# Patient Record
Sex: FEMALE | Race: WHITE
[De-identification: names, ages, dates, MRNs, and addresses within clinical notes are randomized per-mention and may not be internally consistent; named-entity substitution may affect disease eponyms.]

---

## 2017-04-11 ENCOUNTER — HOSPITAL ENCOUNTER (OUTPATIENT)
Dept: HOSPITAL 75 - RAD | Age: 62
End: 2017-04-11
Attending: NURSE PRACTITIONER
Payer: MEDICARE

## 2017-04-11 DIAGNOSIS — Z12.31: Primary | ICD-10-CM

## 2017-04-11 PROCEDURE — 77067 SCR MAMMO BI INCL CAD: CPT

## 2017-04-13 NOTE — DIAGNOSTIC IMAGING REPORT
Bilateral screening mammogram.



The current study was also evaluated with a Computer Aided

Detection (CAD) system.



INDICATION: Screening. No current complaints stated on the

questionnaire.



COMPARISON: Old previous from [place]. This study was performed

based on the patient history, however, the previous films could

not be obtained. This is therefore essentially a baseline study.



FINDINGS:

The breasts are composed of heterogeneously dense parenchyma

which may decrease mammographic sensitivity. The breasts are

less dense in the medial aspect on both sides. There is a

central posterior left breast asymmetry seen with questionable

correlate along the left MLO view central aspect. There are

numerous calcifications in both breasts mostly however on the

left side in the upper-outer quadrant. Many of these

calcifications are coarse, and these are likely benign. The

right breast demonstrates no suspicious mass or cluster of

calcification.



IMPRESSION: Focal asymmetry in the central posterior aspect of

the left breast. Also calcifications in the upper outer quadrant

favored to be benign are seen. Magnification views of the

upper-outer quadrant and focal compression view and ultrasound

evaluation for the central left breast focal asymmetry is

recommended.



ACR BI-RADS Category 0: Incomplete. (Needs additional imaging

evaluation).

Result letter will be mailed to the patient.

Note: At least 10% of breast cancer is not imaged by mammography.



Dictated by:



Dictated on workstation # [id number]

## 2017-05-01 ENCOUNTER — HOSPITAL ENCOUNTER (OUTPATIENT)
Dept: HOSPITAL 75 - RAD | Age: 62
End: 2017-05-01
Attending: NURSE PRACTITIONER
Payer: MEDICARE

## 2017-05-01 DIAGNOSIS — N64.89: Primary | ICD-10-CM

## 2017-05-01 PROCEDURE — 76641 ULTRASOUND BREAST COMPLETE: CPT
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
## 2017-07-13 ENCOUNTER — HOSPITAL ENCOUNTER (OUTPATIENT)
Dept: HOSPITAL 75 - RAD | Age: 62
End: 2017-07-13
Attending: NURSE PRACTITIONER
Payer: MEDICARE

## 2017-07-13 DIAGNOSIS — R79.89: Primary | ICD-10-CM

## 2017-07-13 PROCEDURE — 76770 US EXAM ABDO BACK WALL COMP: CPT

## 2017-07-13 NOTE — DIAGNOSTIC IMAGING REPORT
Bilateral renal ultrasound.



INDICATION: Elevated creatinine.



FINDINGS:

The right kidney is 9.6 cm, and the left kidney is 10.7 cm in

length. There is no hydronephrosis or focal lesion seen.



The urinary bladder appears unremarkable.



IMPRESSION: Unremarkable exam.



Dictated by: 



  Dictated on workstation # [id number]

## 2019-06-25 ENCOUNTER — HOSPITAL ENCOUNTER (OUTPATIENT)
Dept: HOSPITAL 75 - RAD | Age: 64
End: 2019-06-25
Attending: NURSE PRACTITIONER
Payer: MEDICARE

## 2019-06-25 DIAGNOSIS — Z12.31: Primary | ICD-10-CM

## 2019-06-25 DIAGNOSIS — Z78.0: ICD-10-CM

## 2019-06-25 PROCEDURE — 77067 SCR MAMMO BI INCL CAD: CPT

## 2019-06-25 PROCEDURE — 77080 DXA BONE DENSITY AXIAL: CPT
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
## 2019-06-26 NOTE — DIAGNOSTIC IMAGING REPORT
INDICATION: 64-year-old postmenopausal female.



COMPARISON: None.



FINDINGS:



AP Spine L2-L4:  

[BMD (g/cm2): 1.158] [T-Score: -0.3] [Z-Score: 0.0]

[BMD Previous: na] [BMD % Change: na]



LT Hip Neck:       

[BMD (g/cm2): 0.858] [T-Score: -1.3] [Z-Score: -0.6]



LT Hip Total:       

[BMD (g/cm2):0.954] [T-Score:-0.4] [Z-Score: -0.1]

[BMD Previous: na] [BMD % Change: na]



RT Hip Neck:      

[BMD (g/cm2):1.007] [T-Score:-0.2] [Z-Score:0.4]



RT Hip Total:      

[BMD (g/cm2):10.92] [T-score:0.7] [Z-Score:1.0]

[BMD Previous:na] [BMD % Change:na]



*Indicates significant change from prior examination based on 95%

confidence level.



World Health Organization criteria for BMD interpretation

classify patients as Normal (T-score at or above -1.0),

Osteopenic (T-score between -1.0 and -2.5) or Osteoporotic

(T-score at or below -2.5).



LIMITATIONS AND MODIFICATION: Degenerative changes in the lumbar

spine may falsely elevate bone density.



FRACTURE RISK (FRAX SCORE):

The ten year probability of (%): 

Major Osteoporotic Fracture: [7.3]

Hip Fracture: [0.6]



IMPRESSION:

1. Osteopenia (Low bone mass).

2. Baseline examination.

3. See below National Osteoporosis Foundation guidelines on when

to potentially initiate pharmacologic therapy. 



Based on the National Osteoporosis Foundation Guidelines,

pharmacologic treatment should be initiated in any of the

following, unless clinical conditions suggest otherwise:



*  Any patient with prior fragility fracture of the hip or

vertebrae. A spine fracture indicates 5X risk for subsequent

spine fracture and 2X risk for subsequent hip fracture.



*  Osteoporosis (T-score <-2.5).



*  Postmenopausal women and men age 50 and older with low bone

mass/osteopenia (T-score between -1.0 and -2.5) by DXA and

10-year major osteoporotic fracture greater than 20% or a 10-year

probability of hip fracture greater than 3%. These fracture risks

are supplied above in the FRAX score, if applicable.



*  Clinician judgement and/or patient preferences may indicate

treatment for people with 10-year fracture probabilities above or

below these levels.



Dictated by: 



  Dictated on workstation # [id number]

## 2019-11-18 ENCOUNTER — HOSPITAL ENCOUNTER (OUTPATIENT)
Dept: HOSPITAL 75 - RAD | Age: 64
End: 2019-11-18
Attending: INTERNAL MEDICINE
Payer: MEDICARE

## 2019-11-18 DIAGNOSIS — R73.09: ICD-10-CM

## 2019-11-18 DIAGNOSIS — J45.909: ICD-10-CM

## 2019-11-18 DIAGNOSIS — N18.3: Primary | ICD-10-CM

## 2019-11-18 PROCEDURE — 76770 US EXAM ABDO BACK WALL COMP: CPT

## 2019-11-18 NOTE — DIAGNOSTIC IMAGING REPORT
PROCEDURE: US Renal Bilateral.



TECHNIQUE: Multiple real-time grayscale images were obtained over

the kidneys in various projections bilaterally.



INDICATION: Chronic kidney disease, stage III.



FINDINGS: Right kidney measures 10.8 x 6.3 x 4.3 cm and the left

kidney measures 11.0 x 4.9 x 6.0 cm. Cortical thickness and

echogenicity is normal. No calculi are seen. There is no

hydronephrosis. Bilateral ureteral jets were visualized within

the bladder.



IMPRESSION: Unremarkable renal ultrasound.



Dictated by: 



  Dictated on workstation # [id number]